# Patient Record
Sex: MALE | Race: WHITE | NOT HISPANIC OR LATINO | Employment: FULL TIME | URBAN - METROPOLITAN AREA
[De-identification: names, ages, dates, MRNs, and addresses within clinical notes are randomized per-mention and may not be internally consistent; named-entity substitution may affect disease eponyms.]

---

## 2021-11-11 ENCOUNTER — APPOINTMENT (OUTPATIENT)
Dept: OCCUPATIONAL MEDICINE | Facility: CLINIC | Age: 46
End: 2021-11-11

## 2021-11-11 PROCEDURE — 99000 SPECIMEN HANDLING OFFICE-LAB: CPT | Performed by: FAMILY MEDICINE

## 2021-11-11 PROCEDURE — 99499 UNLISTED E&M SERVICE: CPT | Performed by: FAMILY MEDICINE

## 2023-02-20 ENCOUNTER — TELEPHONE (OUTPATIENT)
Dept: WOUND CARE | Facility: CLINIC | Age: 48
End: 2023-02-20
Payer: COMMERCIAL

## 2023-02-20 NOTE — TELEPHONE ENCOUNTER
RICARDO Gonzalez from [facility] in [place], [place] called to request an appointment for the patient who has multiple wounds on both feet as well as a wound on the inner and outer left ankle. Can reach out to patient directly for scheduling at [phone] (may be a number for a relative of the patient)    Lisa (RICARDO) [phone]  Patient [phone]

## 2023-02-20 NOTE — TELEPHONE ENCOUNTER
Please schedule with Dr. Hoffmann, Dr. Pierre, Dr. Anthony, or Sabina garcía [facility] for next available appointment.    **If scheduling with Sabina AMAYA please schedule a follow up 2-3 weeks after initial appointment.    Is the patient able to make their own medical decisions? Yes    Is patient a THANH lift? PLEASE INQUIRE WHEN MAKING THE APPOINTMENT AND PUT IN APPOINTMENT NOTES    Routing to  Wound Healing Scheduling.

## 2023-02-21 NOTE — TELEPHONE ENCOUNTER
Writer called the patient/family number provided by Lisa from [facility], but the family member had not spoken with the patient in a month and did not know how to get a hold of him. Writer then called Lisa to ask for any other contact info. Lisa only had a number for [facility] where she said patient discharged to as of 2/21/23. Dajuanr then contacted [facility] in hopes of connecting with the patient. They could neither confirm or deny that the patient was in their facility so writer left the clinic name and phone number to be passed along to the patient if they are able.

## 2023-02-23 ENCOUNTER — HOSPITAL ENCOUNTER (EMERGENCY)
Facility: HOSPITAL | Age: 48
Discharge: HOME | End: 2023-02-23
Attending: EMERGENCY MEDICINE | Admitting: EMERGENCY MEDICINE
Payer: COMMERCIAL

## 2023-02-23 VITALS
RESPIRATION RATE: 18 BRPM | OXYGEN SATURATION: 99 % | HEIGHT: 72 IN | SYSTOLIC BLOOD PRESSURE: 153 MMHG | BODY MASS INDEX: 23.03 KG/M2 | HEART RATE: 91 BPM | DIASTOLIC BLOOD PRESSURE: 96 MMHG | TEMPERATURE: 98.6 F | WEIGHT: 170 LBS

## 2023-02-23 DIAGNOSIS — L97.419 DIABETIC ULCER OF RIGHT HEEL ASSOCIATED WITH TYPE 2 DIABETES MELLITUS, UNSPECIFIED ULCER STAGE (H): ICD-10-CM

## 2023-02-23 DIAGNOSIS — E11.621 DIABETIC ULCER OF RIGHT HEEL ASSOCIATED WITH TYPE 2 DIABETES MELLITUS, UNSPECIFIED ULCER STAGE (H): ICD-10-CM

## 2023-02-23 DIAGNOSIS — Z48.89 ENCOUNTER FOR POST SURGICAL WOUND CHECK: ICD-10-CM

## 2023-02-23 PROCEDURE — 99283 EMERGENCY DEPT VISIT LOW MDM: CPT

## 2023-02-23 ASSESSMENT — ACTIVITIES OF DAILY LIVING (ADL)
ADLS_ACUITY_SCORE: 37
ADLS_ACUITY_SCORE: 37

## 2023-02-23 NOTE — ED PROVIDER NOTES
EMERGENCY DEPARTMENT ENCOUNTER      NAME: Chauncey Mera  AGE: 47 year old male  YOB: 1975  MRN: [id number]  EVALUATION DATE & TIME: No admission date for patient encounter.    PCP: No Ref-Primary, Physician    ED PROVIDER: Trish Cervantes M.D.      Chief Complaint   Patient presents with     Wound Check         FINAL IMPRESSION:  1. Encounter for post surgical wound check    2. Diabetic ulcer of right heel associated with type 2 diabetes mellitus, unspecified ulcer stage (H)          ED COURSE & MEDICAL DECISION MAKIN:08 PM Patient with recent discharge to [facility] living unaware of how to seek out wound care or how to care for his wound. I was able to find referral information and place wound care  referral to center that has already attempted to reach out to him, gave him phone number to set up that appointment, phone number and instructions to contact his surgeon for an appointment tomorrow to talk about his wound care and to ensure he has supplies he needs for wound care going forwards. He is open to making those phone calls today and given those phone numbers directly and on discharge paperwork and will call the number on his insurance card also to set up outpatient transportation when the appointments are made. No acute change to wounds appreciated with no purulent material, new pain, new fever or trauma at all, and no obvious redness/warmth/edema/swelling which is reassuring. Bandage placed today is sterile and clean and dry and sufficient for today's care while awaiting wound clinic follow up and surgery follow up.    Pertinent Labs & Imaging studies reviewed. (See chart for details)    N95 worn  A face shield was worn also  COVID PPE    Medical Decision Making    History:    Supplemental history from: Documented in chart, if applicable    External Record(s) reviewed: Documented in chart, if applicable.    Work Up:    Chart documentation includes differential considered and any  EKGs or imaging independently interpreted by provider, where specified.    In additional to work up documented, I considered the following work up: Documented in chart, if applicable.    External consultation:    Discussion of management with another provider: Documented in chart, if applicable    Complicating factors:    Care impacted by chronic illness: Chronic Pain, Diabetes and Mental Health    Care affected by social determinants of health: Access to Medical Care, Alcohol Abuse and/or Recreational Drug Use, Housing Insecurity, Literacy and Problems Related to Primary Support Group    Disposition considerations: Discharge. I recommended the patient continue their current prescription strength medication(s): antibiotic therapy. I considered admission, but discharged patient after significant clinical improvement.        At the conclusion of the encounter I discussed the results of all of the tests and the disposition. The questions were answered. The patient or family acknowledged understanding and was agreeable with the care plan.     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    Per chart review and confirmed with patient, he was admitted to [facility] 2/5/2023 with sepsis due to infected diabetic ulcer to left heel with cellulitis and traumatic calcaneal fracture (broken September 2022) in the setting of recent methamphetamine abuse and unaware of foot fracture. He was treated with antibiotic medications and podiatry performed I&D right foot 2/9, wound  care provided and he was discharged to TCU.     Patient reports that two days ago he was transferred from TCU to [facility]. While at [facility] facility he was contemplating that he isn't sure how to change his bandage on his left heel, although he daily changes the bandage covering his right foot plantar chronic wound, so he did not change his dressing and it became wet with nonpurulent nonbloody serous fluid over the course of multiple days. He then realized he will need to schedule a follow up appointment and wound care, and wasn't sure how to do that. Therefore he came to the ER.    Per further chart review, [facility] attempted to contact patient 2/20/2023 (3 days ago) but was only able to get ahold of his family who has not been in contact with him for a long time and didn't know how to contact him. [facility] was unwilling to confirm if he did or did not reside there or pass a message to him.    He has no new fever or trauma, is taking his prescribed medications, does not recall who his surgeon was (Dr Asia Carrasquillo from podiatry per chart review), who he is supposed to follow up with for wound care ([facility]) or how he is supposed to get there or make appointments, although he volunteers he speculates medicab may be the best option and he does have medical insurance, has a card, has their number on his card, has access to a phone and has discharge paperwork at his [facility] facility which he thinks could have names and phone numbers on them.          REVIEW OF SYSTEMS   All other systems reviewed and are negative except as noted above in HPI.    PAST MEDICAL HISTORY:  Past Medical History:   Diagnosis Date     Diabetes (H)      DM type 2, goal A1C 7-8 10/6/2014     DRESS syndrome 12/2014    Unknown medication - Vanco vs Imipenem vs Glipizide     Hyperlipidemia LDL goal <100 10/6/2014     Osteomyelitis (H)  9/2014    partial first ray resection of the right foot.     Peripheral vascular disease (H) 6/18/2014       PAST SURGICAL HISTORY:  Past Surgical History:   Procedure Laterality Date     AMPUTATE TOE(S) Right 9/23/2014    Procedure: AMPUTATE TOE(S);  Surgeon: Madhav Oritz DPM;  Location: [facility] OR     AMPUTATE TOE(S) Right 6/30/2015    Procedure: AMPUTATE TOE(S);  Surgeon: Madhav Ortiz DPM;  Location: [facility] OR     BIOPSY BONE FOOT Right 11/20/2014    Procedure: BIOPSY BONE FOOT;  Surgeon: Madhav Ortiz DPM;  Location: [facility] OR     ORTHOPEDIC SURGERY         CURRENT MEDICATIONS:    ACE/ARB NOT PRESCRIBED, INTENTIONAL,  blood glucose monitoring (ONE TOUCH DELICA) lancets  blood glucose test strip  clindamycin (CLEOCIN) 300 MG capsule  colchicine 0.6 MG tablet  HYDROcodone-acetaminophen (NORCO) 5-325 MG per tablet  ibuprofen (ADVIL,MOTRIN) 600 MG tablet  ibuprofen 200 MG capsule  insulin aspart (NOVOLOG PEN) 100 UNIT/ML soln  insulin aspart (NOVOLOG PEN) 100 UNIT/ML soln  insulin aspart (NOVOLOG PEN) 100 UNIT/ML soln  insulin glargine (LANTUS) 100 UNIT/ML PEN  insulin pen needle 31G X 5 MM  LORazepam (ATIVAN) 1 MG tablet  metFORMIN (GLUCOPHAGE) 1000 MG tablet  oxyCODONE-acetaminophen (PERCOCET) 5-325 MG per tablet  oxyCODONE-acetaminophen (PERCOCET) 5-325 MG per tablet  pantoprazole (PROTONIX) 40 MG enteric coated tablet  Probiotic Product (ACIDOPHILUS PROBIOTIC BLEND) CAPS  senna-docusate (SENOKOT-S;PERICOLACE) 8.6-50 MG per tablet  simvastatin (ZOCOR) 40 MG tablet  traZODone (DESYREL) 50 MG tablet        ALLERGIES:  Allergies   Allergen Reactions     Imipenem Rash     Possible cause of DRESS     Vancomycin Rash     Possible cause of DRESS     Hydroxyzine Swelling     Glipizide Rash     Possible cause of DRESS       FAMILY HISTORY:  Family History   Problem Relation Age of Onset     Hypertension Brother      Unknown/Adopted Paternal Grandmother      Unknown/Adopted Paternal Grandfather      Family  History Negative Mother      Family History Negative Father      SLE Maternal Aunt      Rheumatoid Arthritis Maternal Aunt        SOCIAL HISTORY:   Social History     Socioeconomic History     Marital status: Single   Occupational History     Occupation:    Tobacco Use     Smoking status: Never     Smokeless tobacco: Never   Substance and Sexual Activity     Alcohol use: Yes     Comment: occasional beer     Drug use: No     Sexual activity: Yes     Partners: Female   Social History Narrative    , 3 children        VITALS:  Patient Vitals for the past 24 hrs:   BP Temp Temp src Pulse Resp SpO2 Height Weight   02/23/23 1240 (!) 153/96 98.6  F (37  C) Temporal 91 18 99 % 1.829 m (6') 77.1 kg (170 lb)       PHYSICAL EXAM    GENERAL: Awake, alert.  In no acute distress.   HEENT: Normocephalic, atraumatic.  Pupils equal, round and reactive.  Conjunctiva normal.  EOMI.  NECK: No stridor or apparent deformity.  PULMONARY: Symmetrical breath sounds without distress.  Lungs clear to auscultation bilaterally without wheezes, rhonchi or rales.  CARDIO: Regular rate and rhythm.  No significant murmur, rub or gallop.  Radial pulses strong and symmetrical.  ABDOMINAL: Abdomen soft, non-distended and non-tender to palpation.  No CVAT, no palpable hepatosplenomegaly.  EXTREMITIES: No lower extremity swelling or edema. Right midfoot well healed amputation appreciated with right plantar midfoot wound with granulation tissue, dry without drainage or redness or warmth or induration. Left medial heel incision without redness/swelling or purulent drainage, small serous drainage soaked into old appearing dressing which was removed and area rebandaged with sterile clean dry dressing.   NEURO: Alert and oriented to person, place and time.  Cranial nerves grossly intact.  No focal motor deficit.  PSYCH: Normal mood and affect  SKIN: No rashes         Trish Cervantes MD  02/23/23 3904

## 2023-02-23 NOTE — ED TRIAGE NOTES
Pt comes from a sober house--2 weeks ago in [facility] he had a left foot infection which was drained.  He took the last of his antibiotics yesterday.  Arrives with a dressing on that foot which was saturated with serous drainage.  Pt states his foot has been draining a lot of fluid. Minimal pain. Dressing changed in triage. Is diabetic--IC=048 for medics.

## 2023-02-23 NOTE — DISCHARGE INSTRUCTIONS
Call our wound care clinic TODAY. The phone number is printed here on these discharge instructions. They will help you set up an appointment at their office. Please re-dress your wound twice each day as directed by your surgeon. Please see your surgery discharge instructions for more information.

## 2023-02-24 NOTE — TELEPHONE ENCOUNTER
Referral received again for patient via the workqueue. Patient called at [phone] but he is not able to make an appointment at this time due to transportation issues but will call [facility] once he is able to come to [place].